# Patient Record
Sex: FEMALE | Race: WHITE | Employment: UNEMPLOYED | URBAN - METROPOLITAN AREA
[De-identification: names, ages, dates, MRNs, and addresses within clinical notes are randomized per-mention and may not be internally consistent; named-entity substitution may affect disease eponyms.]

---

## 2017-02-13 RX ORDER — EFINACONAZOLE 100 MG/ML
SOLUTION TOPICAL
Refills: 3 | COMMUNITY
Start: 2017-01-03

## 2017-05-16 ENCOUNTER — HOSPITAL ENCOUNTER (OUTPATIENT)
Dept: WOMENS IMAGING | Age: 52
Discharge: HOME | End: 2017-05-16
Payer: COMMERCIAL

## 2017-05-16 DIAGNOSIS — Z13.9 SCREENING: ICD-10-CM

## 2017-05-16 PROCEDURE — 77063 BREAST TOMOSYNTHESIS BI: CPT

## 2018-05-16 ENCOUNTER — HOSPITAL ENCOUNTER (OUTPATIENT)
Dept: WOMENS IMAGING | Age: 53
Discharge: HOME | End: 2018-05-18
Payer: COMMERCIAL

## 2018-05-16 DIAGNOSIS — Z12.31 ENCOUNTER FOR SCREENING MAMMOGRAM FOR BREAST CANCER: ICD-10-CM

## 2018-05-16 PROCEDURE — 77063 BREAST TOMOSYNTHESIS BI: CPT

## 2018-07-31 PROBLEM — M54.12 CERVICAL RADICULOPATHY: Status: ACTIVE | Noted: 2018-07-31

## 2018-08-24 PROBLEM — M47.812 CERVICAL SPONDYLOSIS WITHOUT MYELOPATHY: Status: ACTIVE | Noted: 2018-08-24

## 2019-06-13 ENCOUNTER — HOSPITAL ENCOUNTER (OUTPATIENT)
Dept: WOMENS IMAGING | Age: 54
Discharge: HOME | End: 2019-06-15
Payer: COMMERCIAL

## 2019-06-13 DIAGNOSIS — Z12.31 ENCOUNTER FOR SCREENING MAMMOGRAM FOR BREAST CANCER: ICD-10-CM

## 2019-06-13 PROCEDURE — 77063 BREAST TOMOSYNTHESIS BI: CPT

## 2020-06-18 ENCOUNTER — HOSPITAL ENCOUNTER (OUTPATIENT)
Dept: WOMENS IMAGING | Age: 55
Discharge: HOME | End: 2020-06-20
Payer: COMMERCIAL

## 2020-06-18 PROCEDURE — 77063 BREAST TOMOSYNTHESIS BI: CPT

## 2020-07-16 ENCOUNTER — HOSPITAL ENCOUNTER (OUTPATIENT)
Dept: GENERAL RADIOLOGY | Age: 55
Discharge: HOME | End: 2020-07-18
Payer: COMMERCIAL

## 2020-07-16 PROCEDURE — 71046 X-RAY EXAM CHEST 2 VIEWS: CPT

## 2021-07-16 ENCOUNTER — HOSPITAL ENCOUNTER (OUTPATIENT)
Dept: WOMENS IMAGING | Age: 56
Discharge: HOME | End: 2021-07-18
Payer: COMMERCIAL

## 2021-07-16 DIAGNOSIS — Z12.31 ENCOUNTER FOR MAMMOGRAM TO ESTABLISH BASELINE MAMMOGRAM: ICD-10-CM

## 2021-07-16 PROCEDURE — 77063 BREAST TOMOSYNTHESIS BI: CPT

## 2022-07-18 ENCOUNTER — HOSPITAL ENCOUNTER (OUTPATIENT)
Dept: WOMENS IMAGING | Age: 57
Discharge: HOME | End: 2022-07-20
Payer: COMMERCIAL

## 2022-07-18 DIAGNOSIS — Z12.31 ENCOUNTER FOR SCREENING MAMMOGRAM FOR MALIGNANT NEOPLASM OF BREAST: ICD-10-CM

## 2022-07-18 PROCEDURE — 77063 BREAST TOMOSYNTHESIS BI: CPT

## 2023-02-27 ENCOUNTER — HOSPITAL ENCOUNTER (OUTPATIENT)
Dept: GENERAL RADIOLOGY | Age: 58
Discharge: HOME | End: 2023-03-01
Payer: COMMERCIAL

## 2023-02-27 DIAGNOSIS — J06.9 ACUTE RESPIRATORY DISEASE: ICD-10-CM

## 2023-02-27 DIAGNOSIS — R06.2 WHEEZING: ICD-10-CM

## 2023-02-27 PROCEDURE — 71046 X-RAY EXAM CHEST 2 VIEWS: CPT

## 2023-02-28 ENCOUNTER — TRANSCRIBE ORDERS (OUTPATIENT)
Dept: ADMINISTRATIVE | Age: 58
End: 2023-02-28

## 2023-02-28 DIAGNOSIS — R06.2 WHEEZING: Primary | ICD-10-CM

## 2023-04-17 PROBLEM — R06.2 WHEEZING: Status: ACTIVE | Noted: 2023-04-17

## 2023-07-18 ENCOUNTER — HOSPITAL ENCOUNTER (OUTPATIENT)
Dept: WOMENS IMAGING | Age: 58
Discharge: HOME | End: 2023-07-20
Payer: COMMERCIAL

## 2023-07-18 DIAGNOSIS — Z12.31 ENCOUNTER FOR SCREENING MAMMOGRAM FOR MALIGNANT NEOPLASM OF BREAST: ICD-10-CM

## 2023-07-18 PROCEDURE — 77063 BREAST TOMOSYNTHESIS BI: CPT

## 2023-09-20 ENCOUNTER — HOSPITAL ENCOUNTER (OUTPATIENT)
Dept: ULTRASOUND IMAGING | Age: 58
Discharge: HOME | End: 2023-09-22
Payer: COMMERCIAL

## 2023-09-20 ENCOUNTER — TRANSCRIBE ORDERS (OUTPATIENT)
Dept: ADMINISTRATIVE | Age: 58
End: 2023-09-20

## 2023-09-20 DIAGNOSIS — R10.11 RUQ PAIN: Primary | ICD-10-CM

## 2023-09-20 DIAGNOSIS — R10.11 ABDOMINAL PAIN, RIGHT UPPER QUADRANT: ICD-10-CM

## 2023-09-20 PROCEDURE — 76705 ECHO EXAM OF ABDOMEN: CPT

## 2023-09-26 ENCOUNTER — HOSPITAL ENCOUNTER (OUTPATIENT)
Dept: CT IMAGING | Age: 58
Discharge: HOME | End: 2023-09-28
Payer: COMMERCIAL

## 2023-09-26 DIAGNOSIS — R10.11 RUQ PAIN: ICD-10-CM

## 2023-09-26 PROCEDURE — 74176 CT ABD & PELVIS W/O CONTRAST: CPT

## 2023-09-28 ENCOUNTER — HOSPITAL ENCOUNTER (OUTPATIENT)
Dept: DIABETES SERVICES | Age: 58
Setting detail: THERAPIES SERIES
Discharge: HOME | End: 2023-09-28
Payer: COMMERCIAL

## 2023-09-28 PROCEDURE — G0108 DIAB MANAGE TRN  PER INDIV: HCPCS

## 2023-09-28 SDOH — ECONOMIC STABILITY: FOOD INSECURITY: ADDITIONAL INFORMATION: NO

## 2023-09-28 ASSESSMENT — PROBLEM AREAS IN DIABETES QUESTIONNAIRE (PAID)
FEELING DEPRESSED WHEN YOU THINK ABOUT LIVING WITH DIABETES: 0
PAID-5 TOTAL SCORE: 2
WORRYING ABOUT THE FUTURE AND THE POSSIBILITY OF SERIOUS COMPLICATIONS: 2
FEELING SCARED WHEN YOU THINK ABOUT LIVING WITH DIABETES: 0
FEELING THAT DIABETES IS TAKING UP TOO MUCH OF YOUR MENTAL AND PHYSICAL ENERGY EVERY DAY: 0
COPING WITH COMPLICATIONS OF DIABETES: 0

## 2023-09-28 ASSESSMENT — SLEEP AND FATIGUE QUESTIONNAIRES
HAVE YOU BEEN TOLD, OR NOTICED ON YOUR OWN, THAT YOU STOP BREATHING OR STRUGGLE TO BREATHE IN YOUR SLEEP: NO
HOW MANY HOURS OF SLEEP ARE YOU GETTING, ON AVERAGE: LESS THAN 7
HOW DO YOU RATE THE QUALITY OF YOUR SLEEP: POOR
HAVE YOU EVER BEEN TESTED FOR SLEEP APNEA: YES

## 2023-09-28 NOTE — PROGRESS NOTES
possible causes and actions to take if occurs. [] Reviewed BG guidelines and troubleshooting unexpected numbers. Evaluation rating:    Knowledge of Hypo and Hyper glycemia treatment []Yes []No     Knowledge of Hypo and Hyper glycemia prevention []Yes []No    Lows since last visit? []Yes []No      Treat appropriately? []Yes  []No    Explainable? []Yes  []No      Ketone testing? []Yes []No   Risk Reduction - sick days   Describe sick day guidelines & indications for physician notification. Identify short term consequences of poor control. Assessment Ratin23  NC   [] Advised of effects of illness on blood glucose. Reviewed management of sick days with group. Advised about importance of continuing diabetes medications, tips for staying hydrated and when to call the doctor. [] Sick day handout given.     [] Sick day toolbox reviewed. Evaluation rating:    Knowledge of sick day plan? []Yes [] No   Healthy Coping:   Identify healthy and unhealthy coping, causes of stress and ways to reduce stress. How depression affects diabetes care and how to seek help if needed. Identify support needed & support network available   Assessment Ratin  23    Patient's PAID-5 Score:2    [x]Patient's PAID-5 (Problem Areas in Diabetes) score is less than 9. No intervention needed at this time. [] Patient's PAID-5 (Problem Areas in Diabetes) score is greater than 8 which indicates possible diabetes related emotional distress and warrants further assessment. [] Support given during appointment. Patient advised to follow up with PCP or psychologist.   [] Letter will be sent to PCP indicating further assessment needed. Jean Fernandez RN   [] Reviewed healthy coping and unhealthy coping with the group. Discussed what ways of coping each person usually uses and brainstormed ways to change to a healthy coping mechanism with the group.      [] Stressed the importance of stress reduction and the

## 2023-10-04 ENCOUNTER — HOSPITAL ENCOUNTER (OUTPATIENT)
Dept: DIABETES SERVICES | Age: 58
Setting detail: THERAPIES SERIES
Discharge: HOME | End: 2023-10-04
Payer: COMMERCIAL

## 2023-10-04 PROCEDURE — 97802 MEDICAL NUTRITION INDIV IN: CPT

## 2023-10-04 NOTE — PROGRESS NOTES
gm per day. []To maintain weight: 3-4 servings/45-60 gms pre meal with 1 snack of 15 gm per day. Patient would benefit from   [] individual appt with dietitian  [x] group MNT with dietitian    Nagi Haywood RN 10/04/23  Discussed basic diabetic diet guidelines. We discussed carb counting, foods that contained carbs, and how to incorporate the correct portions in each meals. I reviewed the importance of not skipping meals. Emphasis was placed on having a set meal schedule, the MyPlate method, and getting regular physical activity. Reviewed strategies when dining out. Advised 2-3 servings of carbs at every meal and 1-2 serving of carb as an evening snack. ~1400kcal daily. Dionne Dyer, MS, RD, LD      Evaluation rating:    Are you following a meal plan? []Yes []No    [] Portion control   [] Plate method   [] Carb counting   [] Label reading    Weight loss since class? []Yes []No   Taking MedicationsIdentify effects of diabetes medicines on blood glucose levels; List diabetes medication taken, action & side effects   Assessment Ratin  23  Handouts provided on both oral and injectable medications. Currently taking: Metformin 500 mg.     [] Currently takes the following complementary or alternative medicines:    [x] Reviewed medication compliance, action, side effects and timing of each. Patient is:  [x]compliant with current meds. Has had some GI problems and if persists she will talk with PCP about XR version or another medication  []noncompliant with current meds. Nagi Haywood RN   [] Discussed all medication classes both oral and injectable to include method of action, side effects and precautions. [] Patient provided handout with their medications for diabetes listed showing method of action and when to take. Evaluation rating:    Any Changes in Medications? Compliant? []Yes []No     Any side effects?     []Yes [] No   Insulin / Injectable - Appropriate injection sites;

## 2023-12-01 ENCOUNTER — HOSPITAL ENCOUNTER (OUTPATIENT)
Age: 58
Discharge: HOME | End: 2023-12-01
Payer: COMMERCIAL

## 2023-12-01 DIAGNOSIS — R00.2 PALPITATIONS: ICD-10-CM

## 2023-12-01 PROCEDURE — 93225 XTRNL ECG REC<48 HRS REC: CPT

## 2023-12-07 ENCOUNTER — HOSPITAL ENCOUNTER (OUTPATIENT)
Age: 58
Discharge: HOME | End: 2023-12-09
Payer: COMMERCIAL

## 2023-12-07 VITALS
HEIGHT: 66 IN | WEIGHT: 194 LBS | DIASTOLIC BLOOD PRESSURE: 86 MMHG | BODY MASS INDEX: 31.18 KG/M2 | SYSTOLIC BLOOD PRESSURE: 120 MMHG

## 2023-12-07 DIAGNOSIS — E03.9 ACQUIRED HYPOTHYROIDISM: ICD-10-CM

## 2023-12-07 DIAGNOSIS — E11.9 TYPE 2 DIABETES MELLITUS WITHOUT COMPLICATION, UNSPECIFIED WHETHER LONG TERM INSULIN USE (HCC): ICD-10-CM

## 2023-12-07 DIAGNOSIS — I10 PRIMARY HYPERTENSION: ICD-10-CM

## 2023-12-07 DIAGNOSIS — R00.2 PALPITATIONS: ICD-10-CM

## 2023-12-07 PROCEDURE — 93306 TTE W/DOPPLER COMPLETE: CPT

## 2023-12-08 LAB
ECHO AO ROOT DIAM: 3.1 CM
ECHO AO ROOT INDEX: 1.57 CM/M2
ECHO AV AREA PEAK VELOCITY: 2.4 CM2
ECHO AV AREA VTI: 2.2 CM2
ECHO AV AREA/BSA PEAK VELOCITY: 1.2 CM2/M2
ECHO AV AREA/BSA VTI: 1.1 CM2/M2
ECHO AV MEAN GRADIENT: 4 MMHG
ECHO AV MEAN VELOCITY: 0.9 M/S
ECHO AV PEAK GRADIENT: 7 MMHG
ECHO AV PEAK VELOCITY: 1.3 M/S
ECHO AV VELOCITY RATIO: 0.77
ECHO AV VTI: 30.3 CM
ECHO BSA: 2.02 M2
ECHO EST RA PRESSURE: 3 MMHG
ECHO LA DIAMETER INDEX: 1.83 CM/M2
ECHO LA DIAMETER: 3.6 CM
ECHO LA TO AORTIC ROOT RATIO: 1.16
ECHO LA VOL A-L A2C: 51 ML (ref 22–52)
ECHO LA VOL A-L A4C: 39 ML (ref 22–52)
ECHO LA VOL MOD A2C: 48 ML (ref 22–52)
ECHO LA VOL MOD A4C: 34 ML (ref 22–52)
ECHO LA VOLUME AREA LENGTH: 45 ML
ECHO LA VOLUME INDEX A-L A2C: 26 ML/M2 (ref 16–34)
ECHO LA VOLUME INDEX A-L A4C: 20 ML/M2 (ref 16–34)
ECHO LA VOLUME INDEX AREA LENGTH: 23 ML/M2 (ref 16–34)
ECHO LA VOLUME INDEX MOD A2C: 24 ML/M2 (ref 16–34)
ECHO LA VOLUME INDEX MOD A4C: 17 ML/M2 (ref 16–34)
ECHO LV E' LATERAL VELOCITY: 10 CM/S
ECHO LV E' SEPTAL VELOCITY: 9 CM/S
ECHO LV EDV A2C: 50 ML
ECHO LV EDV A4C: 71 ML
ECHO LV EDV BP: 59 ML (ref 56–104)
ECHO LV EDV INDEX A4C: 36 ML/M2
ECHO LV EDV INDEX BP: 30 ML/M2
ECHO LV EDV NDEX A2C: 25 ML/M2
ECHO LV EJECTION FRACTION A2C: 68 %
ECHO LV EJECTION FRACTION A4C: 63 %
ECHO LV EJECTION FRACTION BIPLANE: 65 % (ref 55–100)
ECHO LV ESV A2C: 16 ML
ECHO LV ESV A4C: 26 ML
ECHO LV ESV BP: 21 ML (ref 19–49)
ECHO LV ESV INDEX A2C: 8 ML/M2
ECHO LV ESV INDEX A4C: 13 ML/M2
ECHO LV ESV INDEX BP: 11 ML/M2
ECHO LV FRACTIONAL SHORTENING: 39 % (ref 28–44)
ECHO LV INTERNAL DIMENSION DIASTOLE INDEX: 2.49 CM/M2
ECHO LV INTERNAL DIMENSION DIASTOLIC: 4.9 CM (ref 3.9–5.3)
ECHO LV INTERNAL DIMENSION SYSTOLIC INDEX: 1.52 CM/M2
ECHO LV INTERNAL DIMENSION SYSTOLIC: 3 CM
ECHO LV IVSD: 0.9 CM (ref 0.6–0.9)
ECHO LV IVSS: 1.4 CM
ECHO LV MASS 2D: 176 G (ref 67–162)
ECHO LV MASS INDEX 2D: 89.4 G/M2 (ref 43–95)
ECHO LV POSTERIOR WALL DIASTOLIC: 1.1 CM (ref 0.6–0.9)
ECHO LV POSTERIOR WALL SYSTOLIC: 1.6 CM
ECHO LV RELATIVE WALL THICKNESS RATIO: 0.45
ECHO LVOT AREA: 3.1 CM2
ECHO LVOT AV VTI INDEX: 0.71
ECHO LVOT DIAM: 2 CM
ECHO LVOT MEAN GRADIENT: 2 MMHG
ECHO LVOT PEAK GRADIENT: 4 MMHG
ECHO LVOT PEAK VELOCITY: 1 M/S
ECHO LVOT STROKE VOLUME INDEX: 34.1 ML/M2
ECHO LVOT SV: 67.2 ML
ECHO LVOT VTI: 21.4 CM
ECHO MV A VELOCITY: 0.89 M/S
ECHO MV E DECELERATION TIME (DT): 204.7 MS
ECHO MV E VELOCITY: 0.91 M/S
ECHO MV E/A RATIO: 1.02
ECHO MV E/E' LATERAL: 9.1
ECHO MV E/E' RATIO (AVERAGED): 9.61
ECHO PV ACCELERATION TIME (AT): 99 MS
ECHO PV MAX VELOCITY: 1 M/S
ECHO PV PEAK GRADIENT: 4 MMHG
ECHO PVEIN A DURATION: 118 MS
ECHO PVEIN A VELOCITY: 0.3 M/S
ECHO PVEIN PEAK D VELOCITY: 0.4 M/S
ECHO PVEIN PEAK S VELOCITY: 0.5 M/S
ECHO PVEIN S/D RATIO: 1.3
ECHO RIGHT VENTRICULAR SYSTOLIC PRESSURE (RVSP): 24 MMHG
ECHO RV TAPSE: 2.9 CM (ref 1.7–?)
ECHO TV REGURGITANT MAX VELOCITY: 2.31 M/S
ECHO TV REGURGITANT PEAK GRADIENT: 21 MMHG

## 2024-04-02 ENCOUNTER — OFFICE VISIT (OUTPATIENT)
Dept: GASTROENTEROLOGY | Facility: CLINIC | Age: 59
End: 2024-04-02
Payer: COMMERCIAL

## 2024-04-02 VITALS
HEIGHT: 66 IN | OXYGEN SATURATION: 95 % | BODY MASS INDEX: 25.71 KG/M2 | DIASTOLIC BLOOD PRESSURE: 81 MMHG | WEIGHT: 160 LBS | HEART RATE: 64 BPM | SYSTOLIC BLOOD PRESSURE: 130 MMHG

## 2024-04-02 DIAGNOSIS — K76.0 FATTY (CHANGE OF) LIVER, NOT ELSEWHERE CLASSIFIED: ICD-10-CM

## 2024-04-02 DIAGNOSIS — R74.8 ELEVATED LIVER ENZYMES: ICD-10-CM

## 2024-04-02 PROCEDURE — 99204 OFFICE O/P NEW MOD 45 MIN: CPT | Performed by: INTERNAL MEDICINE

## 2024-04-02 PROCEDURE — 1036F TOBACCO NON-USER: CPT | Performed by: INTERNAL MEDICINE

## 2024-04-02 PROCEDURE — 99214 OFFICE O/P EST MOD 30 MIN: CPT | Performed by: INTERNAL MEDICINE

## 2024-04-02 RX ORDER — ROSUVASTATIN CALCIUM 5 MG/1
TABLET, COATED ORAL
COMMUNITY
Start: 2017-01-19

## 2024-04-02 RX ORDER — HYDROCHLOROTHIAZIDE 12.5 MG/1
1 TABLET ORAL DAILY
COMMUNITY
Start: 2018-07-26

## 2024-04-02 RX ORDER — BLOOD SUGAR DIAGNOSTIC
STRIP MISCELLANEOUS
COMMUNITY
Start: 2024-01-27

## 2024-04-02 RX ORDER — ALBUTEROL SULFATE 0.83 MG/ML
3 SOLUTION RESPIRATORY (INHALATION)
COMMUNITY
Start: 2022-09-27

## 2024-04-02 RX ORDER — LEVOTHYROXINE SODIUM 75 UG/1
CAPSULE ORAL
COMMUNITY

## 2024-04-02 RX ORDER — LANCETS
EACH MISCELLANEOUS
COMMUNITY
Start: 2024-01-27

## 2024-04-02 RX ORDER — LISINOPRIL 10 MG/1
TABLET ORAL
COMMUNITY
Start: 2021-02-23

## 2024-04-02 RX ORDER — VERAPAMIL HYDROCHLORIDE 180 MG/1
180 TABLET, FILM COATED, EXTENDED RELEASE ORAL NIGHTLY
COMMUNITY

## 2024-04-02 RX ORDER — CHOLECALCIFEROL (VITAMIN D3) 25 MCG
1000 TABLET ORAL DAILY
COMMUNITY

## 2024-04-02 RX ORDER — ALBUTEROL SULFATE 90 UG/1
2 AEROSOL, METERED RESPIRATORY (INHALATION) EVERY 4 HOURS PRN
COMMUNITY
Start: 2022-09-25

## 2024-04-02 RX ORDER — METFORMIN HYDROCHLORIDE 500 MG/1
TABLET ORAL
COMMUNITY
Start: 2023-08-28

## 2024-04-02 RX ORDER — LANCETS
1 EACH MISCELLANEOUS 4 TIMES DAILY
COMMUNITY
Start: 2023-09-29

## 2024-04-02 ASSESSMENT — COLUMBIA-SUICIDE SEVERITY RATING SCALE - C-SSRS
2. HAVE YOU ACTUALLY HAD ANY THOUGHTS OF KILLING YOURSELF?: NO
1. IN THE PAST MONTH, HAVE YOU WISHED YOU WERE DEAD OR WISHED YOU COULD GO TO SLEEP AND NOT WAKE UP?: NO
6. HAVE YOU EVER DONE ANYTHING, STARTED TO DO ANYTHING, OR PREPARED TO DO ANYTHING TO END YOUR LIFE?: NO

## 2024-04-02 ASSESSMENT — ENCOUNTER SYMPTOMS
OCCASIONAL FEELINGS OF UNSTEADINESS: 0
LOSS OF SENSATION IN FEET: 0
DEPRESSION: 0

## 2024-04-02 ASSESSMENT — PATIENT HEALTH QUESTIONNAIRE - PHQ9
2. FEELING DOWN, DEPRESSED OR HOPELESS: NOT AT ALL
SUM OF ALL RESPONSES TO PHQ9 QUESTIONS 1 AND 2: 0
1. LITTLE INTEREST OR PLEASURE IN DOING THINGS: NOT AT ALL

## 2024-04-02 ASSESSMENT — PAIN SCALES - GENERAL: PAINLEVEL: 0-NO PAIN

## 2024-04-02 NOTE — PROGRESS NOTES
HEPATOLOGY NEW PATIENT VISIT    April 2, 2024    Dr. Lyssa Pitts       Patient Name:   MARJORIE VO  Medical Record Number: [id number]  YOB: 1965    Dear Dr. Pitts,    I had the pleasure of seeing Marjorie Vo for consultation in the [facility] ([facility]). History and physical examination was performed. Pertinent available laboratory, imaging, pathology results were reviewed.     She was actually being seen by one of the hepatologist at [facility] who had her get a liver biopsy.  However, she is also referred to me.    History of Present Illness:   The patient is a 58 year old white female who is referred for evaluation of fatty liver disease, hepatomegaly and elevated LFTs.    She underwent imaging at an outside hospital and was found to have hepatomegaly.  She was then referred to one of the hepatologist at [facility].  They had her get a FibroScan that showed no significant fibrosis.  They did  her about fatty liver risk factors.  She then developed abnormal LFTs and they had her undergo a liver biopsy.  The results are listed below.    She reports that her [facility] doctor did not give her an adequate explanation on the results of the liver biopsy.  For that reason she came here.  She has been researching all of her own results extensively and spending a considerable amount of time on the Internet as well.    The patient has risk factors for fatty liver disease including diabetes, hyperlipidemia, and being overweight.  She has been on therapy for hyperlipidemia.  She is on therapy for diabetes.  She aggressively worked on diet, weight loss and exercise and lost 40 to 50 pounds over the last several months intentionally.    She reports that she has been on Crestor for a considerable amount of time.  It was stopped in December 2023 due to elevated LFTs.  After the Crestor was stopped in December 2023, the LFTs normalized.  The Crestor was restarted in late February 2024  but was stopped in late March 2024 due to elevated LFTs.    She was taking Justin seeds but denied any other herbal products.  She occasionally takes Tylenol or NSAIDs.  She is not on any other high risk medications with regard to liver toxicity.    The patient denied any past history of acute hepatitis or jaundice.      She has never had any manifestations of liver disease including no jaundice, ascites, hepatic encephalopathy, or variceal bleeding.     She presented today for evaluation.  She denied any specific liver-related complaints.     Past Medical/Surgical History:   Fatty liver, elevated LFT's, dorsal wrist ganglion, elevated LFTs, COVID, arrhythmia, hypertension, hypothyroidism, hyperlipidemia, diabetes, basal cell carcinoma of the skin, snoring.    Family History:   Mother had hypertension, hyperlipidemia, colon polyps, emphysema and lung cancer.  Father had emphysema and issues from alcoholic liver disease.  Paternal uncle had colon cancer.    Social History:   She does have a history of significant alcohol use in the past.  She stopped in September 2023.  She denied blood transfusions.  She denied tattoos.  She denied tobacco use.  She denied intravenous drug use.    Review of systems: As noted above.  She has the intentional weight loss as noted above.  No fever, chills, visual changes, auditory changes, shortness of breath, chest pain, abdominal pain, GI bleeding, diarrhea, constipation, depression, dysuria, hematuria, musculoskeletal issues, or rash.    Medical, Surgical, Family, and Social Histories  Past Medical History:   Diagnosis Date    Anesthesia of skin 11/11/2021    Numbness and tingling    Personal history of other diseases of the circulatory system     History of hypertension    Personal history of other endocrine, nutritional and metabolic disease     History of high cholesterol       Past Surgical History:   Procedure Laterality Date    OTHER SURGICAL HISTORY  07/11/2019    Tooth  extraction    OTHER SURGICAL HISTORY  07/11/2019    Tonsillectomy    OTHER SURGICAL HISTORY  07/11/2019    Strabismus surgery    OTHER SURGICAL HISTORY  04/22/2020    Laparoscopy    OTHER SURGICAL HISTORY  04/22/2020    Hysteroscopy    OTHER SURGICAL HISTORY  07/13/2020    Dilation and curettage    OTHER SURGICAL HISTORY  09/17/2019    Hemithyroidectomy    US GUIDED THYROID BIOPSY  7/26/2019    US GUIDED THYROID BIOPSY 7/26/2019 [facility]       No family history on file.    Social History     Socioeconomic History    Marital status:      Spouse name: Not on file    Number of children: Not on file    Years of education: Not on file    Highest education level: Not on file   Occupational History    Not on file   Tobacco Use    Smoking status: Never    Smokeless tobacco: Never   Substance and Sexual Activity    Alcohol use: Not Currently    Drug use: Never    Sexual activity: Not on file   Other Topics Concern    Not on file   Social History Narrative    Not on file     Social Determinants of Health     Financial Resource Strain: Not on file   Food Insecurity: Not on file   Transportation Needs: Not on file   Physical Activity: Not on file   Stress: Not on file   Social Connections: Not on file   Intimate Partner Violence: Not on file   Housing Stability: Not on file       Allergies and Current Medications  Allergies   Allergen Reactions    Codeine GI intolerance, GI Upset, Unknown, Nausea/vomiting, Nausea And Vomiting, Nausea Only and Other    Penicillins Rash, Hives and Other    Hydrocodone-Acetaminophen Itching and Other    Nickel Rash     Current Outpatient Medications   Medication Sig Dispense Refill    Accu-Chek Fastclix Lancet Drum misc USE AS DIRECTED FOUR TIMES DAILY      albuterol 2.5 mg /3 mL (0.083 %) nebulizer solution 3 mL every 3 (three) hours.      albuterol 90 mcg/actuation inhaler Inhale 2 puffs every 4 hours if needed.      cholecalciferol (Vitamin D3) 25 MCG (1000 UT) tablet Take 1  "tablet (1,000 Units) by mouth once daily.      Contour Next Test Strips strip Test FOUR TIMES DAILY      hydroCHLOROthiazide (Microzide) 12.5 mg tablet Take 1 tablet (12.5 mg) by mouth once daily.      lancets misc 1 each 4 times a day.      levothyroxine (Tirosint) 75 mcg capsule       lisinopril 10 mg tablet       metFORMIN (Glucophage) 500 mg tablet       rosuvastatin (Crestor) 5 mg tablet       SARS-CoV-2 vaccine (Moderna) 100 mcg/0.5 mL suspension       verapamil SR (Calan-SR) 180 mg ER tablet Take 1 tablet (180 mg) by mouth once daily at bedtime. Do not crush or chew.       No current facility-administered medications for this visit.        Physical Exam  /81 (BP Location: Left arm, Patient Position: Sitting, BP Cuff Size: Large adult)   Pulse 64   Ht 1.676 m (5' 6\")   Wt 72.6 kg (160 lb)   SpO2 95%   BMI 25.82 kg/m²       Physical Examination:   General Appearance: alert and in no acute distress.   HEENT: oropharynx without lesions. Anicteric sclerae.   Neck supple, nontender, without adenopathy, thyromegaly, or JVD.   Lungs clear to auscultation and percussion.   Heart RRR without murmurs, rubs, or gallops.   Abdomen: Soft, nontender, bowel sounds positive, without obvious ascites. Liver and spleen not palpable.   Extremities full ROM, no atrophy, normal strength. No edema.   Skin no specific lesions.   Neurological exam nonfocal, alert and oriented.  No asterixis.  No spider angiomata, or palmar erythema.    Labs March 2024 AST 50, ALT 90.    Labs February 2024 AST 17, ALT 15.    Labs 1/19/2024 protein 7.8, albumin 4.8, alk phos 83, bili 0.7, AST 27, ALT 29.    Labs 1/18/2024 INR 1.1, WBC 6.3, Hgb 13.3, platelets 230.    Labs 1/15/2024 celiac panel negative, smooth muscle antibody 20.    Labs 1/2/2024 alpha-1 antitrypsin phenotype MM, iron saturation 14%, antimitochondrial antibody negative.    Labs 12/28/2023 ceruloplasmin 34, , hepatitis C antibody negative, ferritin 112, hepatitis A " antibody negative, hepatitis B core antibody negative, antinuclear antibody 320, protein 6.7, albumin 4.2, alk phos 116, bilirubin 0.7, AST 72, .    Labs 11/21/2023 hemoglobin A1c 5.9%.    Labs 9/19/2023 alk phos 67, bilirubin 0.7, AST 33, ALT 43, lipase 28, amylase 48.    Labs 9/6/2023 cholesterol 134, triglycerides 77, TSH 3.95, alk phos 61, bilirubin 0.6, AST 30, ALT 30.    Labs 5/11/2022 hemoglobin A1c 7%.    Labs 8/23/2022 alk phos 90, bilirubin 0.7, AST 24, ALT 30.    Labs 8/14/2019 alk phos 61, bilirubin 0.6, AST 20, ALT 24.    CT without contrast 9/26/2023:  Impression    Hepatomegaly.    Postsurgical changes discussed.    US 9/20/2023:  Impression    Questionable prominence of the right renal pyramids without overt  hydronephrosis.  No evidence for obstructing uropathy      Liver Biopsy 1/29/2024:  FINAL DIAGNOSIS    A. Liver, biopsy:  -Liver with single non-necrotizing granuloma.  -Periportal and perivenular fibrosis with one focus suspicious for early fibrous bridge formation.  -See comment.        The liver parenchymal architecture is preserved. One portal tract contains a non-necrotizing granuloma. The granuloma is negative for refractile material when examined under polarized light. The portal tracts otherwise contain no significant inflammation. The native interlobular bile ducts appear to be present and unremarkable. The portal veins are within normal limits. Within the lobular parenchyma, no significant steatosis, inflammation, cholestasis, or hepatocellular apoptosis/necrosis is appreciated. A trichrome stain highlights periportal and perivenular fibrosis. One focus suspicious for early fibrous bridge formation is appreciated. A PAS-D stain is negative for intrahepatocellular PAS positive globules. An iron stain is negative. A GMS stain is negative for fungal organisms. An AFB stain is negative for acid fast positive organisms.      Overall the findings are not specific to any one etiology.  The differential diagnosis for hepatic granuloma is broad and includes infection, medications, and sarcoidosis amongst others. Other findings to suggest primary biliary cholangitis are not present. The pattern of fibrosis in this case is also not specific. Morphologic features of autoimmune hepatitis are not present.       FibroScan at [facility] late 2023 revealed a median liver stiffness of 5 kPa with an IQR of 10% and .          Assessment/Plan:     Fatty liver disease  Hepatomegaly  Elevated liver function tests    The patient is referred to me for evaluation of fatty liver disease, hepatomegaly and elevated LFTs.    As noted above, she has been followed at [facility] for these issues.  They recommended MEDRANO risk factor reduction.    Luckily, her FibroScan in late 2023 showed no fibrosis.    We did speak about fatty liver disease. I explained that the risk factors include diabetes, obesity, hyperlipidemia and alcohol use. I explained that she needs to work on diet, weight loss and exercise.  She also needs to work aggressively with the primary provider about hyperlipidemia and diabetes.  I did explain that 20-25% of patients with MEDRANO may proceed to cirrhosis.    She then developed a one-time elevation in her LFTs.  The exact etiology was not clear.  Due to a positive CONNOR, she underwent a liver biopsy at [facility].  The biopsy did show 1 granuloma but otherwise was nonspecific.  There was some fibrosis.  There was no evidence of autoimmune hepatitis.  Antimitochondrial antibody was negative.      Interestingly, her LFTs have completely normalized since then.  However, when she has been put back on Crestor, the numbers increase.  When her Crestor is stopped, the numbers normalize.  She can discuss the risk/benefit of this with her PCP.  However, I would favor not putting her back on Crestor.    It may not be unreasonable to consider a repeat liver biopsy in about 2 years.    She should also avoid all alcohol use.    We will  check a hepatitis B surface antigen and hepatitis B surface antibody with her next labs.    She should get LFTs in 2 and 4 months and see us back in about 5 months.    Thank you for allowing me to participate in the care of this patient. Please feel free to contact me with any questions regarding their care.     Sincerely,     Burak Green MD, FAASLD, FACG.   Medical Director, Hepatology  Senior Attending Physician  [facility]  [facility]  Professor of Medicine  Division of Gastroenterology and Liver Disease  [facility] [employer]  [address] [place], [place] [id number]  Phone: [phone]  Fax: [phone].      This document was generated with a computerized dictation system.  Because of this, there could be errors in grammar and/or content.

## 2024-09-04 ENCOUNTER — APPOINTMENT (OUTPATIENT)
Dept: GASTROENTEROLOGY | Facility: CLINIC | Age: 59
End: 2024-09-04
Payer: COMMERCIAL